# Patient Record
Sex: MALE | Race: BLACK OR AFRICAN AMERICAN
[De-identification: names, ages, dates, MRNs, and addresses within clinical notes are randomized per-mention and may not be internally consistent; named-entity substitution may affect disease eponyms.]

---

## 2018-03-15 ENCOUNTER — HOSPITAL ENCOUNTER (EMERGENCY)
Dept: HOSPITAL 72 - EMR | Age: 33
Discharge: HOME | End: 2018-03-15
Payer: COMMERCIAL

## 2018-03-15 VITALS — WEIGHT: 197 LBS | BODY MASS INDEX: 30.92 KG/M2 | HEIGHT: 67 IN

## 2018-03-15 VITALS — SYSTOLIC BLOOD PRESSURE: 114 MMHG | DIASTOLIC BLOOD PRESSURE: 53 MMHG

## 2018-03-15 VITALS — DIASTOLIC BLOOD PRESSURE: 95 MMHG | SYSTOLIC BLOOD PRESSURE: 142 MMHG

## 2018-03-15 DIAGNOSIS — E11.65: ICD-10-CM

## 2018-03-15 DIAGNOSIS — N39.0: Primary | ICD-10-CM

## 2018-03-15 LAB
ADD MANUAL DIFF: NO
ALBUMIN SERPL-MCNC: 4 G/DL (ref 3.4–5)
ALBUMIN/GLOB SERPL: 1 {RATIO} (ref 1–2.7)
ALP SERPL-CCNC: 106 U/L (ref 46–116)
ALT SERPL-CCNC: 29 U/L (ref 12–78)
ANION GAP SERPL CALC-SCNC: 11 MMOL/L (ref 5–15)
APPEARANCE UR: (no result)
APTT PPP: YELLOW S
AST SERPL-CCNC: 19 U/L (ref 15–37)
BASOPHILS NFR BLD AUTO: 1.5 % (ref 0–2)
BILIRUB SERPL-MCNC: 0.5 MG/DL (ref 0.2–1)
BUN SERPL-MCNC: 10 MG/DL (ref 7–18)
CALCIUM SERPL-MCNC: 8.9 MG/DL (ref 8.5–10.1)
CHLORIDE SERPL-SCNC: 98 MMOL/L (ref 98–107)
CO2 SERPL-SCNC: 26 MMOL/L (ref 21–32)
CREAT SERPL-MCNC: 0.9 MG/DL (ref 0.55–1.3)
EOSINOPHIL NFR BLD AUTO: 0 % (ref 0–3)
ERYTHROCYTE [DISTWIDTH] IN BLOOD BY AUTOMATED COUNT: 11.5 % (ref 11.6–14.8)
GLOBULIN SER-MCNC: 4.2 G/DL
GLUCOSE UR STRIP-MCNC: (no result) MG/DL
HCT VFR BLD CALC: 49.6 % (ref 42–52)
HGB BLD-MCNC: 17.2 G/DL (ref 14.2–18)
KETONES UR QL STRIP: (no result)
LEUKOCYTE ESTERASE UR QL STRIP: (no result)
LYMPHOCYTES NFR BLD AUTO: 15.2 % (ref 20–45)
MCV RBC AUTO: 89 FL (ref 80–99)
MONOCYTES NFR BLD AUTO: 7.3 % (ref 1–10)
NEUTROPHILS NFR BLD AUTO: 75.9 % (ref 45–75)
NITRITE UR QL STRIP: NEGATIVE
PH UR STRIP: 5 [PH] (ref 4.5–8)
PLATELET # BLD: 236 K/UL (ref 150–450)
POTASSIUM SERPL-SCNC: 3.5 MMOL/L (ref 3.5–5.1)
PROT UR QL STRIP: (no result)
RBC # BLD AUTO: 5.55 M/UL (ref 4.7–6.1)
SODIUM SERPL-SCNC: 135 MMOL/L (ref 136–145)
SP GR UR STRIP: 1.02 (ref 1–1.03)
UROBILINOGEN UR-MCNC: 1 MG/DL (ref 0–1)
WBC # BLD AUTO: 8.6 K/UL (ref 4.8–10.8)

## 2018-03-15 PROCEDURE — 80329 ANALGESICS NON-OPIOID 1 OR 2: CPT

## 2018-03-15 PROCEDURE — 96361 HYDRATE IV INFUSION ADD-ON: CPT

## 2018-03-15 PROCEDURE — 82803 BLOOD GASES ANY COMBINATION: CPT

## 2018-03-15 PROCEDURE — 83735 ASSAY OF MAGNESIUM: CPT

## 2018-03-15 PROCEDURE — 85025 COMPLETE CBC W/AUTO DIFF WBC: CPT

## 2018-03-15 PROCEDURE — 99284 EMERGENCY DEPT VISIT MOD MDM: CPT

## 2018-03-15 PROCEDURE — 87086 URINE CULTURE/COLONY COUNT: CPT

## 2018-03-15 PROCEDURE — 82962 GLUCOSE BLOOD TEST: CPT

## 2018-03-15 PROCEDURE — 71045 X-RAY EXAM CHEST 1 VIEW: CPT

## 2018-03-15 PROCEDURE — 81003 URINALYSIS AUTO W/O SCOPE: CPT

## 2018-03-15 PROCEDURE — 80307 DRUG TEST PRSMV CHEM ANLYZR: CPT

## 2018-03-15 PROCEDURE — 93005 ELECTROCARDIOGRAM TRACING: CPT

## 2018-03-15 PROCEDURE — 82009 KETONE BODYS QUAL: CPT

## 2018-03-15 PROCEDURE — 36415 COLL VENOUS BLD VENIPUNCTURE: CPT

## 2018-03-15 PROCEDURE — 36600 WITHDRAWAL OF ARTERIAL BLOOD: CPT

## 2018-03-15 PROCEDURE — 96374 THER/PROPH/DIAG INJ IV PUSH: CPT

## 2018-03-15 PROCEDURE — 80053 COMPREHEN METABOLIC PANEL: CPT

## 2018-03-15 RX ADMIN — SODIUM CHLORIDE ONE MLS/HR: 0.9 INJECTION INTRAVENOUS at 19:03

## 2018-03-15 RX ADMIN — ACETAMINOPHEN ONE MG: 500 TABLET, FILM COATED ORAL at 19:06

## 2018-03-15 NOTE — EMERGENCY ROOM REPORT
History of Present Illness


General


Chief Complaint:  Abnormal Labs


Source:  Patient, Friend





Present Illness


HPI


33-year-old male, brought in by , for abnormal labs.  

Patient is a poor historian.  He has no medical problems.  He is only 

complaining of generalized weakness.  Denies any nausea or vomiting or 

diarrhea.   states that labs were done 2 days ago, showing 

hyperglycemia and ketones in his urine.  Patient has no history of diabetes


Allergies:  


Coded Allergies:  


     No Known Allergies (Unverified , 3/15/18)





Patient History


Past Medical History:  see triage record


Past Surgical History:  none


Pertinent Family History:  none


Reviewed Nursing Documentation:  PMH: Agreed, PSxH: Agreed





Review of Systems


All Other Systems:  negative except mentioned in HPI





Physical Exam





Vital Signs








  Date Time  Temp Pulse Resp B/P (MAP) Pulse Ox O2 Delivery O2 Flow Rate FiO2


 


3/15/18 16:07 99.0 108 20 156/99 96 Room Air  





 99.0       








Sp02 EP Interpretation:  reviewed, normal


General Appearance:  alert, GCS 15, mild distress


Head:  normocephalic, atraumatic


Eyes:  bilateral eye normal inspection, bilateral eye PERRL, bilateral eye EOMI


ENT:  normal ENT inspection, normal pharynx, normal voice, moist mucus membranes


Neck:  normal inspection, full range of motion, supple


Respiratory:  normal inspection, lungs clear, normal breath sounds, no 

respiratory distress, no retraction, no wheezing, speaking full sentences, 

chest symmetrical


Cardiovascular #1:  normal inspection, regular rate, rhythm, normal capillary 

refill


Cardiovascular #2:  2+ radial (R), 2+ radial (L)


Gastrointestinal:  normal inspection, non tender, soft, non-distended, no 

guarding


Genitourinary:  no CVA tenderness


Musculoskeletal:  normal inspection, back normal, normal range of motion, non-

tender


Neurologic:  alert, other - no neurolgical deficit


Psychiatric:  depressed affect, anxious, other - poor historian


Skin:  normal inspection, normal color, no rash, warm/dry, well hydrated, 

normal turgor





Medical Decision Making


Diagnostic Impression:  


 Primary Impression:  


 UTI (urinary tract infection)


 Additional Impressions:  


 Diabetes


 Hyperglycemia


ER Course


33-year-old male brought in for abnormal labs, hyperglycemia





DDX:


new onset diabetes, hyperglycemia, rule out DKA





Plan:


Obtain labs, ua, EKG, CXR


Fluids, insulin





ER course:


Patient given bolus 2


Patient tachycardic to the 120s, this improved to 100 with fluids, patient also 

probably febrile, Tylenol given.  Patient found to have UTI, ceftriaxone given


Patient labs noting hyperglycemia but no DKA


Patient appears well at bedside, he is able to take the pills, and wants to go 

home.  I discussed with his coordinator and the patient that if he cannot take 

the pills, feeling worse, then he can come right back to the emergency room





Disposition:


Patient will be discharged home with follow-up with his primary care doctor in 

one week.  Keflex given for UTI





Please note that this Emergency Department Report was dictated using [employer] technology software, occasionally this can lead to 

erroneous entry secondary to interpretation by the dictation equipment.





EKG Diagnostic Results


EP Interpretation: Yes


Rate: Tachycardic


Rhythm: NSR


ST Segments: No acute changes 


ASA given to patient: No





Rhythm Strip


EP Interpretation: Yes


Rate: 133


Rhythm: NSR, no PVCs, no ectopy





Chest X-ray 


CXR: Ordered: Yes


1 view


Indication: Pain


EP interpretation: Yes


Interpretation: No consolidation, no effusion, no PTX, no acute cardiopulmonary 

disease


Impression: No acute disease





Electronically signed by Marin Harman MD





Laboratory Tests








Test


  3/15/18


16:30 3/15/18


16:45 3/15/18


17:05


 


Urine Color Yellow    


 


Urine Appearance


  Slightly


cloudy 


  


 


 


Urine pH 5 (4.5-8.0)    


 


Urine Specific Gravity


  1.025


(1.005-1.035) 


  


 


 


Urine Protein


  2+ (NEGATIVE)


H 


  


 


 


Urine Glucose (UA)


  4+ (NEGATIVE)


H 


  


 


 


Urine Ketones


  4+ (NEGATIVE)


H 


  


 


 


Urine Occult Blood


  2+ (NEGATIVE)


H 


  


 


 


Urine Nitrite


  Negative


(NEGATIVE) 


  


 


 


Urine Bilirubin


  Negative


(NEGATIVE) 


  


 


 


Urine Urobilinogen


  1 MG/DL


(0.0-1.0)  H 


  


 


 


Urine Leukocyte Esterase


  2+ (NEGATIVE)


H 


  


 


 


Urine RBC


  5-10 /HPF (0 -


0)  H 


  


 


 


Urine WBC


  10-15 /HPF (0


- 0)  H 


  


 


 


Urine Squamous Epithelial


Cells Few /LPF


(NONE/OCC) 


  


 


 


Urine Bacteria


  Few /HPF


(NONE) 


  


 


 


White Blood Count


  


  8.6 K/UL


(4.8-10.8) 


 


 


Red Blood Count


  


  5.55 M/UL


(4.70-6.10) 


 


 


Hemoglobin


  


  17.2 G/DL


(14.2-18.0) 


 


 


Hematocrit


  


  49.6 %


(42.0-52.0) 


 


 


Mean Corpuscular Volume  89 FL (80-99)   


 


Mean Corpuscular Hemoglobin


  


  30.9 PG


(27.0-31.0) 


 


 


Mean Corpuscular Hemoglobin


Concent 


  34.6 G/DL


(32.0-36.0) 


 


 


Red Cell Distribution Width


  


  11.5 %


(11.6-14.8)  L 


 


 


Platelet Count


  


  236 K/UL


(150-450) 


 


 


Mean Platelet Volume


  


  8.0 FL


(6.5-10.1) 


 


 


Neutrophils (%) (Auto)


  


  75.9 %


(45.0-75.0)  H 


 


 


Lymphocytes (%) (Auto)


  


  15.2 %


(20.0-45.0)  L 


 


 


Monocytes (%) (Auto)


  


  7.3 %


(1.0-10.0) 


 


 


Eosinophils (%) (Auto)


  


  0.0 %


(0.0-3.0) 


 


 


Basophils (%) (Auto)


  


  1.5 %


(0.0-2.0) 


 


 


Sodium Level


  


  135 MMOL/L


(136-145)  L 


 


 


Potassium Level


  


  3.5 MMOL/L


(3.5-5.1) 


 


 


Chloride Level


  


  98 MMOL/L


() 


 


 


Carbon Dioxide Level


  


  26 MMOL/L


(21-32) 


 


 


Anion Gap


  


  11 mmol/L


(5-15) 


 


 


Blood Urea Nitrogen


  


  10 mg/dL


(7-18) 


 


 


Creatinine


  


  0.9 MG/DL


(0.55-1.30) 


 


 


Estimate Glomerular


Filtration Rate 


  > 60 mL/min


(>60) 


 


 


Glucose Level


  


  228 MG/DL


()  H 


 


 


Calcium Level


  


  8.9 MG/DL


(8.5-10.1) 


 


 


Magnesium Level


  


  1.5 MG/DL


(1.8-2.4)  L 


 


 


Total Bilirubin


  


  0.5 MG/DL


(0.2-1.0) 


 


 


Aspartate Amino Transferase


(AST) 


  19 U/L (15-37)


  


 


 


Alanine Aminotransferase (ALT)


  


  29 U/L (12-78)


  


 


 


Alkaline Phosphatase


  


  106 U/L


() 


 


 


Total Protein


  


  8.2 G/DL


(6.4-8.2) 


 


 


Albumin


  


  4.0 G/DL


(3.4-5.0) 


 


 


Globulin  4.2 g/dL   


 


Albumin/Globulin Ratio  1.0 (1.0-2.7)   


 


Urine Opiates Screen


  


  Negative


(NEGATIVE) 


 


 


Urine Barbiturates Screen


  


  Negative


(NEGATIVE) 


 


 


Phencyclidine (PCP) Screen


  


  Negative


(NEGATIVE) 


 


 


Urine Amphetamines Screen


  


  Negative


(NEGATIVE) 


 


 


Urine Benzodiazepines Screen


  


  Negative


(NEGATIVE) 


 


 


Urine Cocaine Screen


  


  Negative


(NEGATIVE) 


 


 


Urine Marijuana (THC) Screen


  


  Negative


(NEGATIVE) 


 


 


Serum Alcohol  < 3 mg/dL   


 


Acetone Level


  


  Positive-small


(NEGATIVE) 


 


 


Arterial Blood pH


  


  


  7.475


(7.350-7.450)


 


Arterial Blood Partial


Pressure CO2 


  


  33.0 mmHg


(35.0-45.0)  L


 


Arterial Blood Partial


Pressure O2 


  


  73.2 mmHg


(75.0-100.0)  L


 


Arterial Blood HCO3


  


  


  23.7 mmol/L


(22.0-26.0)


 


Arterial Blood Oxygen


Saturation 


  


  95.8 %


(92.0-98.0)


 


Arterial Blood Base Excess   0.9  


 


Eleuterio Test   Positive  











Last Vital Signs








  Date Time  Temp Pulse Resp B/P (MAP) Pulse Ox O2 Delivery O2 Flow Rate FiO2


 


3/15/18 16:07 99.0 108 20 156/99 96 Room Air  





 99.0       








Disposition:  HOME, SELF-CARE


Condition:  Improved


Scripts


Cephalexin* (KEFLEX*) 500 Mg Capsule


500 MG ORAL Q6H, #28 CAP 0 Refills


   Prov: Marin Harman M.D.         3/15/18 


Metformin Hcl* (METFORMIN HCL*) 500 Mg Tablet


500 MG ORAL TWICE A DAY for 7 Days, #14 TAB 0 Refills


   Prov: Marin Harman M.D.         3/15/18











Marin Harman M.D. Mar 15, 2018 16:49

## 2018-03-16 NOTE — DIAGNOSTIC IMAGING REPORT
Indication: Chest pain

 

Technique: One view of the chest

 

Comparison: none

 

Findings: The lungs and pleural spaces are clear. The heart size is normal.

 

Impression: Negative

## 2018-03-20 NOTE — CARDIOLOGY REPORT
--------------- APPROVED REPORT --------------





EKG Measurement

Heart [id number]

ID [id number]

[id number]

[id number]

[id number]





Sinus tachycardia

Nonspecific T wave abnormality

Abnormal ECG